# Patient Record
(demographics unavailable — no encounter records)

---

## 2025-01-31 NOTE — PHYSICAL EXAM

## 2025-01-31 NOTE — DISCUSSION/SUMMARY
[Normal Growth] : growth [Normal Development] : development [None] : No known medical problems [No Elimination Concerns] : elimination [No Feeding Concerns] : feeding [Normal Sleep Pattern] : sleep [Family Support] : family support [Encouraging Literacy Activities] : encouraging literacy activities [Playing with Peers] : playing with peers [Promoting Physical Activity] : promoting physical activity [Safety] : safety [No Medications] : ~He/She~ is not on any medications [Parent/Guardian] : parent/guardian [de-identified] : dry skin behind knees , right now under control [FreeTextEntry1] : 3 y/o female   Growing and developing normally. nl PE vacc utd passed go check and hearing  has not had lead and cbc from 2 year check, will refer to lab to have done  rtc for 3 y/o wcc.   Parent/Guardian agree with and express understanding of plan.

## 2025-01-31 NOTE — HISTORY OF PRESENT ILLNESS
[Parents] : parents [whole ___ oz/d] : consumes [unfilled] oz of whole cow's milk per day [Fruit] : fruit [Vegetables] : vegetables [Meat] : meat [Grains] : grains [Eggs] : eggs [Fish] : fish [Dairy] : dairy [Normal] : Normal [Brushing teeth] : Brushing teeth [In nursery school] : In nursery school [Playtime (60 min/d)] : Playtime 60 min a day [Appropiate parent-child communication] : Appropriate parent-child communication [Child given choices] : Child given choices [Parent has appropriate responses to behavior] : Parent has appropriate responses to behavior [No] : No cigarette smoke exposure [Yes] : At  exposure [Water heater temperature set at <120 degrees F] : Water heater temperature set at <120 degrees F [Car seat in back seat] : Car seat in back seat [Smoke Detectors] : Smoke detectors [Supervised play near cars and streets] : Supervised play near cars and streets [Carbon Monoxide Detectors] : Carbon monoxide detectors [Exposure to electronic nicotine delivery system] : Exposure to electronic nicotine delivery system [Up to date] : Up to date [FreeTextEntry7] : no updates,  [de-identified] : dry skin in the winter [FreeTextEntry1] : 3 y/o female here for wcc no updates no concerns other than dry skin behind knees, usually during the winter

## 2025-01-31 NOTE — DEVELOPMENTAL MILESTONES
[Normal Development] : Normal Development [None] : none [Goes to the bathroom and urinates] : goes to bathroom and urinates by self [Plays and shares with others] : plays and shares with others [Put on coat, jacket, or shirt by self] : puts on coat, jacket, or shirt by self [Begins to play make-believe] : begins to play make-believe [Eats independently] : eats independently [Uses 3-word sentences] : uses 3-word sentences [Uses words that are 75% intelligible] : uses words that are 75% intelligible to strangers [Understands simple prepositions] : understands simple prepositions [Tells a story from a book or TV] : tells a story from a book or TV [Climbs on and off couch] : climbs on and off couch or chair [Jumps forward] : jumps forward [Draws a single [place]] : draws a single [place] [Draws a person with head] : draws a person with head and one other body part